# Patient Record
Sex: FEMALE | Race: BLACK OR AFRICAN AMERICAN | HISPANIC OR LATINO | Employment: FULL TIME | URBAN - METROPOLITAN AREA
[De-identification: names, ages, dates, MRNs, and addresses within clinical notes are randomized per-mention and may not be internally consistent; named-entity substitution may affect disease eponyms.]

---

## 2023-02-25 LAB
CHLAMYDIA TRACH., AMPLIFIED: NEGATIVE
N. GONORRHEA, AMPLIFIED: NEGATIVE
TRICHOMONAS VAGINALIS: NEGATIVE

## 2023-02-28 LAB
HEPATITIS B VIRUS SURFACE AG PRESENCE IN SERUM: NONREACTIVE
HEPATITIS C VIRUS AB PRESENCE IN SERUM: NONREACTIVE
HIV 1/ 2 AG/AB SCREEN: NONREACTIVE
SYPHILIS TOTAL AB: NONREACTIVE

## 2024-12-10 ENCOUNTER — APPOINTMENT (OUTPATIENT)
Dept: PRIMARY CARE | Facility: CLINIC | Age: 34
End: 2024-12-10

## 2024-12-12 ENCOUNTER — APPOINTMENT (OUTPATIENT)
Dept: PRIMARY CARE | Facility: CLINIC | Age: 34
End: 2024-12-12

## 2025-04-01 ENCOUNTER — APPOINTMENT (OUTPATIENT)
Dept: PRIMARY CARE | Facility: CLINIC | Age: 35
End: 2025-04-01
Payer: COMMERCIAL

## 2025-04-01 VITALS
OXYGEN SATURATION: 97 % | TEMPERATURE: 98 F | DIASTOLIC BLOOD PRESSURE: 87 MMHG | SYSTOLIC BLOOD PRESSURE: 129 MMHG | HEART RATE: 97 BPM | HEIGHT: 68 IN | WEIGHT: 243 LBS | BODY MASS INDEX: 36.83 KG/M2

## 2025-04-01 DIAGNOSIS — E66.9 OBESITY (BMI 30-39.9): ICD-10-CM

## 2025-04-01 DIAGNOSIS — Z82.49 FAMILY HISTORY OF ISCHEMIC HEART DISEASE: ICD-10-CM

## 2025-04-01 DIAGNOSIS — E55.9 HYPOVITAMINOSIS D: ICD-10-CM

## 2025-04-01 DIAGNOSIS — Z00.00 HEALTHCARE MAINTENANCE: Primary | ICD-10-CM

## 2025-04-01 DIAGNOSIS — D50.0 IRON DEFICIENCY ANEMIA DUE TO CHRONIC BLOOD LOSS: ICD-10-CM

## 2025-04-01 PROBLEM — H52.203 ASTIGMATISM OF BOTH EYES: Status: ACTIVE | Noted: 2025-04-01

## 2025-04-01 PROBLEM — Z97.5 IUD (INTRAUTERINE DEVICE) IN PLACE: Status: ACTIVE | Noted: 2024-01-02

## 2025-04-01 PROBLEM — B97.7 HPV IN FEMALE: Status: ACTIVE | Noted: 2021-08-20

## 2025-04-01 PROBLEM — H52.13 MYOPIA, BILATERAL: Status: ACTIVE | Noted: 2025-04-01

## 2025-04-01 PROCEDURE — 3008F BODY MASS INDEX DOCD: CPT | Performed by: INTERNAL MEDICINE

## 2025-04-01 PROCEDURE — 1036F TOBACCO NON-USER: CPT | Performed by: INTERNAL MEDICINE

## 2025-04-01 PROCEDURE — 99204 OFFICE O/P NEW MOD 45 MIN: CPT | Performed by: INTERNAL MEDICINE

## 2025-04-01 PROCEDURE — 99385 PREV VISIT NEW AGE 18-39: CPT | Performed by: INTERNAL MEDICINE

## 2025-04-01 ASSESSMENT — ENCOUNTER SYMPTOMS
DIARRHEA: 0
SHORTNESS OF BREATH: 0
SORE THROAT: 0
CHILLS: 0
CHEST TIGHTNESS: 0
WEAKNESS: 0
MYALGIAS: 0
PALPITATIONS: 0
DEPRESSION: 0
AGITATION: 0
NAUSEA: 0
SINUS PRESSURE: 0
ACTIVITY CHANGE: 0

## 2025-04-01 ASSESSMENT — PATIENT HEALTH QUESTIONNAIRE - PHQ9
1. LITTLE INTEREST OR PLEASURE IN DOING THINGS: NOT AT ALL
SUM OF ALL RESPONSES TO PHQ9 QUESTIONS 1 AND 2: 0
2. FEELING DOWN, DEPRESSED OR HOPELESS: NOT AT ALL

## 2025-04-01 ASSESSMENT — PAIN SCALES - GENERAL: PAINLEVEL_OUTOF10: 0-NO PAIN

## 2025-04-01 NOTE — ASSESSMENT & PLAN NOTE
Because she has had multiple people  prematurely and her family from heart disease, particularly on the mother side, we will need to be proactive about any elevations in her cholesterol as well as address her weight and lack of exercise.

## 2025-04-01 NOTE — PROGRESS NOTES
"Subjective   Patient ID: Chepe Mares is a 34 y.o. female who presents for Establish Care and Annual Exam. Referred by Kamlesh Campbell.  She is a nurse at the [employer]. Father had a stroke. Mother passed from a heart attack.  Strong family history. No exercise.  She is here to establish care and get on the right track for her life.    HPI     Review of Systems   Constitutional:  Negative for activity change and chills.   HENT:  Negative for congestion, sinus pressure and sore throat.    Respiratory:  Negative for chest tightness and shortness of breath.    Cardiovascular:  Negative for chest pain and palpitations.   Gastrointestinal:  Negative for diarrhea and nausea.   Musculoskeletal:  Negative for myalgias.   Neurological:  Negative for weakness.   Psychiatric/Behavioral:  Negative for agitation and behavioral problems.        Objective   /87 (BP Location: Left arm, Patient Position: Sitting, BP Cuff Size: Large adult)   Pulse 97   Temp 36.7 °C (98 °F) (Temporal)   Ht 1.727 m (5' 8\")   Wt 110 kg (243 lb)   SpO2 97%   BMI 36.95 kg/m²     Physical Exam  Constitutional:       Appearance: Normal appearance.   HENT:      Head: Normocephalic and atraumatic.      Nose: Nose normal.      Mouth/Throat:      Mouth: Mucous membranes are moist.   Eyes:      Extraocular Movements: Extraocular movements intact.   Cardiovascular:      Rate and Rhythm: Normal rate and regular rhythm.   Pulmonary:      Effort: No respiratory distress.      Breath sounds: No wheezing.   Abdominal:      General: Bowel sounds are normal.      Palpations: Abdomen is soft.   Neurological:      General: No focal deficit present.         Assessment/Plan   Assessment & Plan  Healthcare maintenance  We had an extensive conversation about healthy habits and exercise and she promises to exercise 30 minutes a day 3 times a week.  We will check screening labs as well.  Orders:    Comprehensive Metabolic Panel; Future    Lipid Panel; " Future    Hypovitaminosis D  She will also begin taking vitamin D3 on a regular basis.  Orders:    Vitamin D 25-Hydroxy,Total (for eval of Vitamin D levels); Future    Iron deficiency anemia due to chronic blood loss  Will recheck.  Orders:    CBC; Future    Obesity (BMI 30-39.9)  We talked about strategies to lose weight.  Orders:    Thyroid Stimulating Hormone; Future    Thyroid Stimulating Hormone; Future    Family history of ischemic heart disease  Because she has had multiple people  prematurely and her family from heart disease, particularly on the mother side, we will need to be proactive about any elevations in her cholesterol as well as address her weight and lack of exercise.

## 2025-04-01 NOTE — ASSESSMENT & PLAN NOTE
We talked about strategies to lose weight.  Orders:    Thyroid Stimulating Hormone; Future    Thyroid Stimulating Hormone; Future

## 2025-04-01 NOTE — ASSESSMENT & PLAN NOTE
We had an extensive conversation about healthy habits and exercise and she promises to exercise 30 minutes a day 3 times a week.  We will check screening labs as well.  Orders:    Comprehensive Metabolic Panel; Future    Lipid Panel; Future

## 2025-04-02 LAB
25(OH)D3+25(OH)D2 SERPL-MCNC: 37 NG/ML (ref 30–100)
ALBUMIN SERPL-MCNC: 4.2 G/DL (ref 3.6–5.1)
ALP SERPL-CCNC: 74 U/L (ref 31–125)
ALT SERPL-CCNC: 15 U/L (ref 6–29)
ANION GAP SERPL CALCULATED.4IONS-SCNC: 9 MMOL/L (CALC) (ref 7–17)
AST SERPL-CCNC: 16 U/L (ref 10–30)
BILIRUB SERPL-MCNC: 0.3 MG/DL (ref 0.2–1.2)
BUN SERPL-MCNC: 8 MG/DL (ref 7–25)
CALCIUM SERPL-MCNC: 9.3 MG/DL (ref 8.6–10.2)
CHLORIDE SERPL-SCNC: 105 MMOL/L (ref 98–110)
CHOLEST SERPL-MCNC: 213 MG/DL
CHOLEST/HDLC SERPL: 4.8 (CALC)
CO2 SERPL-SCNC: 25 MMOL/L (ref 20–32)
CREAT SERPL-MCNC: 0.8 MG/DL (ref 0.5–0.97)
EGFRCR SERPLBLD CKD-EPI 2021: 98 ML/MIN/1.73M2
ERYTHROCYTE [DISTWIDTH] IN BLOOD BY AUTOMATED COUNT: 14.4 % (ref 11–15)
GLUCOSE SERPL-MCNC: 74 MG/DL (ref 65–99)
HCT VFR BLD AUTO: 35.3 % (ref 35–45)
HDLC SERPL-MCNC: 44 MG/DL
HGB BLD-MCNC: 11.2 G/DL (ref 11.7–15.5)
LDLC SERPL CALC-MCNC: 133 MG/DL (CALC)
MCH RBC QN AUTO: 26.2 PG (ref 27–33)
MCHC RBC AUTO-ENTMCNC: 31.7 G/DL (ref 32–36)
MCV RBC AUTO: 82.7 FL (ref 80–100)
NONHDLC SERPL-MCNC: 169 MG/DL (CALC)
PLATELET # BLD AUTO: 266 THOUSAND/UL (ref 140–400)
PMV BLD REES-ECKER: 12.4 FL (ref 7.5–12.5)
POTASSIUM SERPL-SCNC: 4.1 MMOL/L (ref 3.5–5.3)
PROT SERPL-MCNC: 7.5 G/DL (ref 6.1–8.1)
RBC # BLD AUTO: 4.27 MILLION/UL (ref 3.8–5.1)
SODIUM SERPL-SCNC: 139 MMOL/L (ref 135–146)
TRIGL SERPL-MCNC: 217 MG/DL
WBC # BLD AUTO: 8.6 THOUSAND/UL (ref 3.8–10.8)

## 2026-04-02 ENCOUNTER — APPOINTMENT (OUTPATIENT)
Dept: PRIMARY CARE | Facility: CLINIC | Age: 36
End: 2026-04-02
Payer: COMMERCIAL